# Patient Record
Sex: MALE | Race: WHITE | Employment: UNEMPLOYED | URBAN - METROPOLITAN AREA
[De-identification: names, ages, dates, MRNs, and addresses within clinical notes are randomized per-mention and may not be internally consistent; named-entity substitution may affect disease eponyms.]

---
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
## 2022-01-10 ENCOUNTER — OFFICE VISIT (OUTPATIENT)
Dept: ORTHOPEDICS | Facility: CLINIC | Age: 22
End: 2022-01-10
Payer: COMMERCIAL

## 2022-01-10 ENCOUNTER — ANCILLARY PROCEDURE (OUTPATIENT)
Dept: GENERAL RADIOLOGY | Facility: CLINIC | Age: 22
End: 2022-01-10
Attending: FAMILY MEDICINE
Payer: COMMERCIAL

## 2022-01-10 DIAGNOSIS — M25.511 CHRONIC RIGHT SHOULDER PAIN: ICD-10-CM

## 2022-01-10 DIAGNOSIS — M79.644 FINGER PAIN, RIGHT: ICD-10-CM

## 2022-01-10 DIAGNOSIS — M79.644 FINGER PAIN, RIGHT: Primary | ICD-10-CM

## 2022-01-10 DIAGNOSIS — G89.29 CHRONIC RIGHT SHOULDER PAIN: ICD-10-CM

## 2022-01-10 PROCEDURE — 99203 OFFICE O/P NEW LOW 30 MIN: CPT | Performed by: FAMILY MEDICINE

## 2022-01-10 PROCEDURE — 73140 X-RAY EXAM OF FINGER(S): CPT | Mod: RT | Performed by: RADIOLOGY

## 2022-01-10 NOTE — LETTER
1/10/2022         RE: Gerry Hartman  [address] [facility] [address]        Dear Colleague,    Thank you for referring your patient, Gerry Hartman, to the [facility] [place]. Please see a copy of my visit note below.            [facility]  1/10/2022    Gerry Hartman's chief complaint for this visit includes:  Chief Complaint   Patient presents with     Consult     right small finger pain,      PCP: Saúl Carey    Reason for visit:     What part of your body is injured / painful?  right small finger    What caused the injury /pain? Dislocated finger in June during a rugby game     How long ago did your injury occur or pain begin? several months ago    What are your most bothersome symptoms? Pain    How would you characterize your symptom?  aching    What makes your symptoms better? Rest    What makes your symptoms worse? Movement    Have you been previously seen for this problem? No    Medical History:    Any recent changes to your medical history? No    Any new medication prescribed since last visit? No    Have you had surgery on this body part before? No    Social History:    Handedness: Right    Review of Systems:    Do you have fever, chills, weight loss? No    Do you have any vision problems? No    Do you have any chest pain or edema? No    Do you have any shortness of breath or wheezing?  No    Do you have stomach problems? No    Do you have any urinary track issues? No    Do you have any numbness or focal weakness? No    Do you have diabetes? No    Do you have problems with bleeding or clotting? No    Do you have an rashes or other skin lesions? No       CHIEF COMPLAINT:  Consult (right small finger pain, )       HISTORY OF PRESENT ILLNESS  Mr. Hartman is a pleasant 21 year old male who presents to clinic today with a right small finger injury.  Everett was playing in a rugby match this past summer when he  dislocated his finger.  His finger was reduced by his teacher.  Since this time he has had persistent swelling at the knuckle of his finger, he also has pain at times.  He does not have any dysfunction with movement.    Everett also brings up a right shoulder issue.  He notes that he has been able to move his shoulder and slide in and out of the socket throughout his life, although he does remember an instance in rugby recently in which his arm was pinned back behind him.  This is his dominant arm.  He has pain with certain ranges of motion, pain at the end range of motion of his right shoulder.  He denies any numbness or tingling    Additional history: as documented    MEDICAL HISTORY  Patient Active Problem List   Diagnosis     Contact dermatitis     Acne, unspecified acne type       Current Outpatient Medications   Medication Sig Dispense Refill     doxycycline monohydrate (MONODOX) 100 MG capsule Take 1 capsule (100 mg) by mouth 2 times daily 60 capsule 4     SUMAtriptan (IMITREX) 50 MG tablet Take 1 tablet (50 mg) by mouth at onset of headache for migraine May repeat in 2 hours. Max 4 tablets/24 hours. 10 tablet 1     tretinoin (RETIN-A) 0.05 % external cream Apply topically At Bedtime 45 g 11       No Known Allergies    Family History   Problem Relation Age of Onset     Thyroid Disease Mother      Arthritis Maternal Grandmother      Eye Disorder Maternal Grandmother      Diabetes Maternal Grandfather      Arthritis Maternal Grandfather        Additional medical/Social/Surgical histories reviewed in EPIC and updated as appropriate.        PHYSICAL EXAM  General  - normal appearance, in no obvious distress  HEENT  - conjunctivae not injected, moist mucous membranes  CV  - normal radial pulse  Pulm  - normal respiratory pattern, non-labored  Musculoskeletal - right 5th finger  - inspection: PIP swelling  - palpation: no bony or soft tissue tenderness, no tenderness at the anatomical snuffbox  - ROM:  MCP 90 deg  flexion   0 deg extension    deg flexion   0 deg extension   DIP 80 deg flexion   0 deg extension  - strength: 5/5  strength  - special tests:  (-) varus  (-) valgus    Musculoskeletal - right shoulder  - inspection: normal bone and joint alignment, no obvious deformity, no scapular winging, no AC step-off  - palpation: no bony or soft tissue tenderness, normal clavicle, non-tender AC  - ROM: full range of motion, not painful, can actively sublux shoulder on command  - strength: 5/5  strength, 5/5 in all shoulder planes  - special tests:  (-) Speed's  (-) Neer  (-) Hawkin's  (-) Lukas's  (+) Stewart's    Neuro  - no numbness, no motor deficit, grossly normal coordination, normal muscle tone  Skin  - no ecchymosis, erythema, warmth, or induration, no obvious rash  Psych  - interactive, appropriate, normal mood and affect                 ASSESSMENT & PLAN  Mr. Hartman is a 21 year old male who presents to clinic today with pain in his right small finger after a dislocation this past summer.    I ordered and independently reviewed an x-ray of his finger, this shows no obvious fracture or healed fracture.    We did discuss that he may continue to experience swelling in his finger moving forward, although the fact that he did not have an obvious fracture is good for his joint preservation, long-term.  We discussed watchful waiting versus ordering an MR to evaluate his ligaments, through shared decision making we did decide to watchfully wait for any pain that may present in the future.    With regards to his shoulder he does have symptoms that are concerning for a labrum tear.  We discussed pathophysiology and some treatment strategies, I am referring him to physical therapy.  Everett attends [employer] in [place], he is leaving in 12 days.  Hopefully can get in for a physical therapy session prior to this    It was a pleasure seeing Everett today.    Toño Griffin DO, CAM  [facility]      This  note was constructed using Dragon dictation software, please excuse any minor errors in spelling, grammar, or syntax.        Again, thank you for allowing me to participate in the care of your patient.        Sincerely,        Toño Griffin, DO

## 2022-01-11 ENCOUNTER — VIRTUAL VISIT (OUTPATIENT)
Dept: DERMATOLOGY | Facility: CLINIC | Age: 22
End: 2022-01-11
Payer: COMMERCIAL

## 2022-01-11 DIAGNOSIS — L70.0 ACNE VULGARIS: Primary | ICD-10-CM

## 2022-01-11 PROCEDURE — 99212 OFFICE O/P EST SF 10 MIN: CPT | Mod: 95 | Performed by: DERMATOLOGY

## 2022-01-11 NOTE — LETTER
"    1/11/2022         RE: Gerry Hartman  [address] [facility] [address]        Dear Colleague,    Thank you for referring your patient, Gerry Hartman, to the [facility]. Please see a copy of my visit note below.        Gerry Hartman is a 21 year old male who is being evaluated via a phone  visit.      The patient has been notified of following:     \"This phone  visit will be conducted via a call between you and your physician/provider. We have found that certain health care needs can be provided without the need for an in-person physical exam.  This service lets us provide the care you need with a video conversation.  If a prescription is necessary we can send it directly to your pharmacy.  If lab work is needed we can place an order for that and you can then stop by our lab to have the test done at a later time.    Phone visits are billed at different rates depending on your insurance coverage.  Please reach out to your insurance provider with any questions.    If during the course of the call the physician/provider feels a phone visit is not appropriate, you will not be charged for this service.\"    Patient has given verbal consent for phone visit? Yes    How would you like to obtain your AVS? Paulhart    Gerry Hartman is a 21 year old year old male patient here today for f/u acne.  On doxy, tretinoin and bpo.  Doing well mostly clear no issues.  Patient has no other skin complaints today.  Remainder of the HPI, Meds, PMH, Allergies, FH, and SH was reviewed in chart.    No past medical history on file.    No past surgical history on file.     Family History   Problem Relation Age of Onset     Thyroid Disease Mother      Arthritis Maternal Grandmother      Eye Disorder Maternal Grandmother      Diabetes Maternal Grandfather      Arthritis Maternal Grandfather        Social History     Socioeconomic History     Marital status: Single     Spouse " name: Not on file     Number of children: Not on file     Years of education: Not on file     Highest education level: Not on file   Occupational History     Not on file   Tobacco Use     Smoking status: Never Smoker     Smokeless tobacco: Never Used   Substance and Sexual Activity     Alcohol use: Not on file     Drug use: Not on file     Sexual activity: Never   Other Topics Concern     Not on file   Social History Narrative     Not on file     Social Determinants of Health     Financial Resource Strain: Not on file   Food Insecurity: Not on file   Transportation Needs: Not on file   Physical Activity: Not on file   Stress: Not on file   Social Connections: Not on file   Intimate Partner Violence: Not on file   Housing Stability: Not on file       Outpatient Encounter Medications as of 1/11/2022   Medication Sig Dispense Refill     doxycycline monohydrate (MONODOX) 100 MG capsule Take 1 capsule (100 mg) by mouth 2 times daily 60 capsule 4     SUMAtriptan (IMITREX) 50 MG tablet Take 1 tablet (50 mg) by mouth at onset of headache for migraine May repeat in 2 hours. Max 4 tablets/24 hours. 10 tablet 1     tretinoin (RETIN-A) 0.05 % external cream Apply topically At Bedtime 45 g 11     No facility-administered encounter medications on file as of 1/11/2022.             Review Of Systems  Skin: As above  Eyes: negative  Ears/Nose/Throat: negative  Respiratory: No shortness of breath, dyspnea on exertion, cough, or hemoptysis  Cardiovascular: negative  Gastrointestinal: negative  Genitourinary: negative  Musculoskeletal: negative  Neurologic: negative  Psychiatric: negative  Hematologic/Lymphatic/Immunologic: negative  Endocrine: negative      O:   Alert & Orientedx3, Mood & Affect wnl,    General appearance normal   Alert, oriented and in no acute distress    Mostly clear per patient     Pulm: Breathing Normal, talking in normal sentences, no shortness of breath during conversation    Neuro/Psych: Orientation:Alert and  Orientedx3 ; Mood/Affect:normal ; no anxiety or depression     A/P:  1.acne  Decrease doxy to once daily  Cont tretinoin nightly and bpo daily  Return to clinic 3months    It was a pleasure speaking to Gerry Hartman today.  Previous clinic  notes and pertinent laboratory tests were reviewed prior to Gerry Hartman's visit.  UV precautions reviewed with patient.  Skin care regimen reviewed with patient: Eliminate harsh soaps, i.e. Dial, zest, irsih spring; Mild soaps such as Cetaphil or Dove sensitive skin, avoid hot or cold showers, aggressive use of emollients including vanicream, cetaphil or cerave discussed with patient.    Return to clinic 3 months    Teledermatology information:  - Location of patient: home  - Location of teledermatologist: [facility]    - Reason teledermatology is appropriate: of National Emergency Regarding Coronavirus disease (COVID 19) Outbreak  - The patient's condition can safely be assessed using telemedicine: yes  - Method of transmission: store and forward teledermatology  - In-person dermatology visit recommendation: no  - Service start time:945am/pm  - Service end time:1000am/pm  - Date of report: 01/11/22        Again, thank you for allowing me to participate in the care of your patient.        Sincerely,        Ruslan Anderson MD

## 2022-01-11 NOTE — PROGRESS NOTES
"    Gerry Hartman is a 21 year old male who is being evaluated via a phone  visit.      The patient has been notified of following:     \"This phone  visit will be conducted via a call between you and your physician/provider. We have found that certain health care needs can be provided without the need for an in-person physical exam.  This service lets us provide the care you need with a video conversation.  If a prescription is necessary we can send it directly to your pharmacy.  If lab work is needed we can place an order for that and you can then stop by our lab to have the test done at a later time.    Phone visits are billed at different rates depending on your insurance coverage.  Please reach out to your insurance provider with any questions.    If during the course of the call the physician/provider feels a phone visit is not appropriate, you will not be charged for this service.\"    Patient has given verbal consent for phone visit? Yes    How would you like to obtain your AVS? Ayaan    Gerry Hartman is a 21 year old year old male patient here today for f/u acne.  On doxy, tretinoin and bpo.  Doing well mostly clear no issues.  Patient has no other skin complaints today.  Remainder of the HPI, Meds, PMH, Allergies, FH, and SH was reviewed in chart.    No past medical history on file.    No past surgical history on file.     Family History   Problem Relation Age of Onset     Thyroid Disease Mother      Arthritis Maternal Grandmother      Eye Disorder Maternal Grandmother      Diabetes Maternal Grandfather      Arthritis Maternal Grandfather        Social History     Socioeconomic History     Marital status: Single     Spouse name: Not on file     Number of children: Not on file     Years of education: Not on file     Highest education level: Not on file   Occupational History     Not on file   Tobacco Use     Smoking status: Never Smoker     Smokeless tobacco: Never Used   Substance and " Sexual Activity     Alcohol use: Not on file     Drug use: Not on file     Sexual activity: Never   Other Topics Concern     Not on file   Social History Narrative     Not on file     Social Determinants of Health     Financial Resource Strain: Not on file   Food Insecurity: Not on file   Transportation Needs: Not on file   Physical Activity: Not on file   Stress: Not on file   Social Connections: Not on file   Intimate Partner Violence: Not on file   Housing Stability: Not on file       Outpatient Encounter Medications as of 1/11/2022   Medication Sig Dispense Refill     doxycycline monohydrate (MONODOX) 100 MG capsule Take 1 capsule (100 mg) by mouth 2 times daily 60 capsule 4     SUMAtriptan (IMITREX) 50 MG tablet Take 1 tablet (50 mg) by mouth at onset of headache for migraine May repeat in 2 hours. Max 4 tablets/24 hours. 10 tablet 1     tretinoin (RETIN-A) 0.05 % external cream Apply topically At Bedtime 45 g 11     No facility-administered encounter medications on file as of 1/11/2022.             Review Of Systems  Skin: As above  Eyes: negative  Ears/Nose/Throat: negative  Respiratory: No shortness of breath, dyspnea on exertion, cough, or hemoptysis  Cardiovascular: negative  Gastrointestinal: negative  Genitourinary: negative  Musculoskeletal: negative  Neurologic: negative  Psychiatric: negative  Hematologic/Lymphatic/Immunologic: negative  Endocrine: negative      O:   Alert & Orientedx3, Mood & Affect wnl,    General appearance normal   Alert, oriented and in no acute distress    Mostly clear per patient     Pulm: Breathing Normal, talking in normal sentences, no shortness of breath during conversation    Neuro/Psych: Orientation:Alert and Orientedx3 ; Mood/Affect:normal ; no anxiety or depression     A/P:  1.acne  Decrease doxy to once daily  Cont tretinoin nightly and bpo daily  Return to clinic 3months    It was a pleasure speaking to Gerry Hartman today.  Previous clinic  notes and  pertinent laboratory tests were reviewed prior to Gerry Hartman's visit.  UV precautions reviewed with patient.  Skin care regimen reviewed with patient: Eliminate harsh soaps, i.e. Dial, zest, irsih spring; Mild soaps such as Cetaphil or Dove sensitive skin, avoid hot or cold showers, aggressive use of emollients including vanicream, cetaphil or cerave discussed with patient.    Return to clinic 3 months    Teledermatology information:  - Location of patient: home  - Location of teledermatologist: [facility]    - Reason teledermatology is appropriate: of National Emergency Regarding Coronavirus disease (COVID 19) Outbreak  - The patient's condition can safely be assessed using telemedicine: yes  - Method of transmission: store and forward teledermatology  - In-person dermatology visit recommendation: no  - Service start time:945am/pm  - Service end time:1000am/pm  - Date of report: 01/11/22

## 2022-08-21 ENCOUNTER — HEALTH MAINTENANCE LETTER (OUTPATIENT)
Age: 22
End: 2022-08-21

## 2022-11-20 ENCOUNTER — HEALTH MAINTENANCE LETTER (OUTPATIENT)
Age: 22
End: 2022-11-20

## 2023-09-16 ENCOUNTER — HEALTH MAINTENANCE LETTER (OUTPATIENT)
Age: 23
End: 2023-09-16

## 2023-09-21 ENCOUNTER — OFFICE VISIT (OUTPATIENT)
Dept: FAMILY MEDICINE | Facility: CLINIC | Age: 23
End: 2023-09-21
Payer: COMMERCIAL

## 2023-09-21 VITALS
HEART RATE: 68 BPM | WEIGHT: 179.4 LBS | TEMPERATURE: 98.6 F | SYSTOLIC BLOOD PRESSURE: 131 MMHG | OXYGEN SATURATION: 99 % | BODY MASS INDEX: 25.11 KG/M2 | HEIGHT: 71 IN | RESPIRATION RATE: 17 BRPM | DIASTOLIC BLOOD PRESSURE: 74 MMHG

## 2023-09-21 DIAGNOSIS — R22.9 SUBCUTANEOUS MASS: ICD-10-CM

## 2023-09-21 DIAGNOSIS — B02.9 HERPES ZOSTER WITHOUT COMPLICATION: Primary | ICD-10-CM

## 2023-09-21 PROCEDURE — 99214 OFFICE O/P EST MOD 30 MIN: CPT | Performed by: FAMILY MEDICINE

## 2023-09-21 RX ORDER — FAMCICLOVIR 500 MG/1
500 TABLET ORAL 3 TIMES DAILY
Qty: 21 TABLET | Refills: 0 | Status: SHIPPED | OUTPATIENT
Start: 2023-09-21 | End: 2023-09-28

## 2023-09-21 ASSESSMENT — PAIN SCALES - GENERAL: PAINLEVEL: SEVERE PAIN (6)

## 2023-09-21 NOTE — PROGRESS NOTES
"  Assessment & Plan     Herpes zoster without complication  start  - famciclovir (FAMVIR) 500 MG tablet  Dispense: 21 tablet; Refill: 0  Ibuprofen as needed pain  Callasap if any vision changes  Follow up only if unimproved.   Subcutaneous mass  Has gotten bigger and smaller  - Adult General Surg Referral               See Patient Instructions    Saúl Carey MD  [facility] LIZZIE Floyd is a 23 year old, presenting for the following health issues:  Derm Problem (rash)      9/21/2023     2:57 PM   Additional Questions   Roomed by loula   Accompanied by self       History of Present Illness       Headaches:   Since the patient's last clinic visit, headaches are: no change  The patient is getting headaches:  Occasionally  He is able to do normal daily activities when he has a migraine.  The patient is taking the following rescue/relief medications:  Ibuprofen (Advil, Motrin)   Patient states \"I get total relief\" from the rescue/relief medications.   The patient is taking the following medications to prevent migraines:  No medications to prevent migraines  In the past 4 weeks, the patient has gone to an Urgent Care or Emergency Room 0 times times due to headaches.    Reason for visit:  Chest rash neck aches fatigue  Symptom onset:  3-7 days ago  Symptoms include:  Rash aches fatigue  Symptom intensity:  Moderate  Symptom progression:  Worsening  Had these symptoms before:  No    He eats 0-1 servings of fruits and vegetables daily.He consumes 0 sweetened beverage(s) daily.He exercises with enough effort to increase his heart rate 20 to 29 minutes per day.  He exercises with enough effort to increase his heart rate 7 days per week.   He is taking medications regularly.         Rash  Onset/Duration: a couple days  Description  Location: left neck/ lower face  Character: painful, burning  Itching: no  Intensity:  moderate  Progression of Symptoms:  worsening  Accompanying signs and " "symptoms:   Fever: No  Body aches or joint pain: YES  Sore throat symptoms: No  Recent cold symptoms: No  History:           Previous episodes of similar rash: None  New exposures:  None  Recent travel: No  Exposure to similar rash: No  Precipitating or alleviating factors:   Therapies tried and outcome:   Had varicella as child  Skin Lesion  Onset/Duration: months  Description  Location: left upper back  Color: brown  Border description: nodule  Character: round  Itching: no  Bleeding:  No  Intensity:  mild  Progression of Symptoms:  waxing and waning  Accompanying signs and symptoms:   Bleeding: No  Scaling: No    Therapies tried and outcome:         Review of Systems   Constitutional, HEENT, cardiovascular, pulmonary, gi and gu systems are negative, except as otherwise noted.      Objective    /74   Pulse 68   Temp 98.6  F (37  C) (Temporal)   Resp 17   Ht 1.815 m (5' 11.46\")   Wt 81.4 kg (179 lb 6.4 oz)   SpO2 99%   BMI 24.70 kg/m    Body mass index is 24.7 kg/m .  Physical Exam   GENERAL: mild distress  EYES: Eyes grossly normal to inspection, PERRL and conjunctivae and sclerae normal  HENT: ear canals and TM's normal, nose and mouth without ulcers or lesions  SKIN: zosteriform eruption - neck    2 by 2 cm rubbery nodule left upper back, subcutaneous , not tender  NEURO: Normal strength and tone, mentation intact and speech normal  PSYCH: mentation appears normal, affect normal/bright  LYMPH: no cervical, supraclavicular, axillary, or inguinal adenopathy                      "

## 2023-09-22 ENCOUNTER — TELEPHONE (OUTPATIENT)
Dept: FAMILY MEDICINE | Facility: CLINIC | Age: 23
End: 2023-09-22
Payer: COMMERCIAL

## 2023-09-22 NOTE — TELEPHONE ENCOUNTER
Called pt and relayed message. He states he just was able to get the medication so I advised him to take 2 pills this evening and then start the TID tomorrow.    Augustine Moreno RN   [facility]

## 2023-09-22 NOTE — TELEPHONE ENCOUNTER
Pt calling about famciclovir, he states he swears he remembers you saying something about taking 1 pill the first day then increasing over the next couple days but the rx states TID for 7 days and pt is wanting you to advise. I did advise as rx states in our system but they still wanted a message to you.    Thanks!  Augustine Moreno RN   [facility]

## 2024-04-19 ENCOUNTER — OFFICE VISIT (OUTPATIENT)
Dept: FAMILY MEDICINE | Facility: CLINIC | Age: 24
End: 2024-04-19
Payer: COMMERCIAL

## 2024-04-19 ENCOUNTER — ANCILLARY PROCEDURE (OUTPATIENT)
Dept: GENERAL RADIOLOGY | Facility: CLINIC | Age: 24
End: 2024-04-19
Payer: COMMERCIAL

## 2024-04-19 VITALS
HEART RATE: 71 BPM | SYSTOLIC BLOOD PRESSURE: 110 MMHG | RESPIRATION RATE: 16 BRPM | OXYGEN SATURATION: 97 % | WEIGHT: 178.3 LBS | TEMPERATURE: 98.2 F | HEIGHT: 71 IN | DIASTOLIC BLOOD PRESSURE: 60 MMHG | BODY MASS INDEX: 24.96 KG/M2

## 2024-04-19 DIAGNOSIS — M25.511 ACUTE PAIN OF RIGHT SHOULDER: ICD-10-CM

## 2024-04-19 DIAGNOSIS — M25.511 ACUTE PAIN OF RIGHT SHOULDER: Primary | ICD-10-CM

## 2024-04-19 PROCEDURE — 73030 X-RAY EXAM OF SHOULDER: CPT | Mod: TC | Performed by: RADIOLOGY

## 2024-04-19 PROCEDURE — 99213 OFFICE O/P EST LOW 20 MIN: CPT

## 2024-04-19 RX ORDER — CELECOXIB 100 MG/1
100 CAPSULE ORAL 2 TIMES DAILY
Qty: 28 CAPSULE | Refills: 0 | Status: SHIPPED | OUTPATIENT
Start: 2024-04-19 | End: 2024-05-03

## 2024-04-19 RX ORDER — CYCLOBENZAPRINE HCL 5 MG
5 TABLET ORAL 2 TIMES DAILY PRN
Qty: 28 TABLET | Refills: 0 | Status: SHIPPED | OUTPATIENT
Start: 2024-04-19 | End: 2024-05-03

## 2024-04-19 ASSESSMENT — PAIN SCALES - GENERAL: PAINLEVEL: MODERATE PAIN (4)

## 2024-04-19 NOTE — PATIENT INSTRUCTIONS
Shoudler Injury    Pain Management  Begin taking medications to manage shoulder pain today as we discussed. These will work best to manage your pain when taken around the clock rather than every so often when your pain is worse  -celebrex 100mg twice daily for 14 days  -Flexeril 5mg     Once you have completed the above medications, you may use the below options for pain management  -Tylenol 325-650mg every 4-6 hours  -Ibuprofen 600-800mg every 6-8 hours    Apply cold packs to the area for 20min at a time 4 times per day. This will help to reduce swelling and offer some numbing to manage pain. You can also use warm packs to manage pain if this provides better relief.    Movement  It is very important to maintain your normal daily activities as tolerated. Avoiding sitting, laying, standing, or remaining in one position for extended periods of time. Continuing to stretch and move your body will help to encourage healing and keep your muscles loose.    If you find that PT would be a helpful options, please reach out and let me know so that I can place that referral    Next Step  If this plan doesn't seem to be helping after 4-6 weeks then I want you to come back to see me, and we can discuss what the next options might be. Seek emergency medical help If pain becomes unbearable, is not responding to pain medication, or is accompanied by fever, warmth, or loss of function.

## 2024-04-19 NOTE — PROGRESS NOTES
Assessment & Plan     (M25.511) Acute pain of right shoulder  (primary encounter diagnosis)  Comment: Acute. No signs of respiratory distress, vital signs stable, and no red flag signs requiring immediate need for medical attention.  No concern for joint effusion, dislocation, or severe fracture.  More likely to believe that patient is experiencing tendinitis or mild rotator cuff injury.  Will do x-ray today for baseline imaging, treat more conservatively with 2 weeks of Celebrex and Flexeril, and home exercises.  Patient declines any need for PT referral at this time, and noted that patient could reach out if referral necessary in the future.  Discussed dosing and possible side effects of medication as well as follow-up to clinic instructions in 4 weeks if symptoms persist or do not improve.  Discussed alarm signs that would require the need for immediate medical attention.  Plan: XR Shoulder Right G/E 3 Views, celecoxib         (CELEBREX) 100 MG capsule, cyclobenzaprine         (FLEXERIL) 5 MG tablet      Ordering of each unique test  Prescription drug management  I spent a total of 20 minutes on the day of the visit.   Time spent by me doing chart review, history and exam, documentation and further activities per the note    FUTURE APPOINTMENTS:       - Follow-up visit in 4 to 6 weeks with PCP if symptoms worsen or do not improve       - Follow-up for annual visit or as needed  Patient Instructions   Shoudler Injury    Pain Management  Begin taking medications to manage shoulder pain today as we discussed. These will work best to manage your pain when taken around the clock rather than every so often when your pain is worse  -celebrex 100mg twice daily for 14 days  -Flexeril 5mg     Once you have completed the above medications, you may use the below options for pain management  -Tylenol 325-650mg every 4-6 hours  -Ibuprofen 600-800mg every 6-8 hours    Apply cold packs to the area for 20min at a time 4 times  per day. This will help to reduce swelling and offer some numbing to manage pain. You can also use warm packs to manage pain if this provides better relief.    Movement  It is very important to maintain your normal daily activities as tolerated. Avoiding sitting, laying, standing, or remaining in one position for extended periods of time. Continuing to stretch and move your body will help to encourage healing and keep your muscles loose.    If you find that PT would be a helpful options, please reach out and let me know so that I can place that referral    Next Step  If this plan doesn't seem to be helping after 4-6 weeks then I want you to come back to see me, and we can discuss what the next options might be. Seek emergency medical help If pain becomes unbearable, is not responding to pain medication, or is accompanied by fever, warmth, or loss of function.      Subha Floyd is a 23 year old, presenting for the following health issues:  office visit and Recheck Medication (Pt reports that he is here to discuss rt shoulder pain that has been present for 3 weeks)      4/19/2024     1:58 PM   Additional Questions   Roomed by padilla   Accompanied by mirtha         4/19/2024     1:58 PM   Patient Reported Additional Medications   Patient reports taking the following new medications none     History of Present Illness       Reason for visit:  Shoulder injury  Symptom onset:  1-2 weeks ago  Symptoms include:  Limitted mobility in right shoulder  Symptom intensity:  Moderate  Symptom progression:  Staying the same  Had these symptoms before:  No  What makes it worse:  Pulling exercises    He eats 0-1 servings of fruits and vegetables daily.He consumes 1 sweetened beverage(s) daily.He exercises with enough effort to increase his heart rate 30 to 60 minutes per day.  He exercises with enough effort to increase his heart rate 5 days per week.   He is taking medications regularly.     Gerry is a 23-year-old male with a  "past medical history significant for contact dermatitis and acne who presents today with concerns for right shoulder pain.  Patient reports that he was skiing on March 31, and went off a jump at which time he sustained an injury to his shoulder after landing on his extended arm.  He notes that after this, symptoms seem to improve over time, but he reports that about 1 week ago he noticed that he had ongoing pain when doing pulling motions from overhead and when anteriorly rotating the shoulder to tuck his hand behind his back.  He notes that pain is not constant, and is only present when doing overhead pulling motions and reaching behind his back.  He notes that pain at those times is sharp and rates it as a 7 out of 10.  He has not found anything that significantly relieves the pain, and has not tried any pain medications.  He declines any weakness, numbness, tingling, burning pain, or pain that radiates down his arm or up his neck.  He declines any joint laxity, or feeling that his arm is giving out.  He has full range of motion and strength in the extremity.  He declines any involvement of the left upper extremity, neck, head, or back.  Patient feels altogether well otherwise and declines any redness, swelling, or heat at the site, and declines any systemic illness symptoms such as fever, chills, and full body aches.      Review of Systems  Constitutional, HEENT, cardiovascular, pulmonary, gi and gu systems are negative, except as otherwise noted.      Objective    /60 (BP Location: Left arm, Patient Position: Sitting, Cuff Size: Adult Regular)   Pulse 71   Temp 98.2  F (36.8  C) (Tympanic)   Resp 16   Ht 1.803 m (5' 11\")   Wt 80.9 kg (178 lb 4.8 oz)   SpO2 97%   BMI 24.87 kg/m    Body mass index is 24.87 kg/m .  Physical Exam   GENERAL: alert and no distress  RESP: lungs clear to auscultation - no rales, rhonchi or wheezes  CV: regular rate and rhythm, normal S1 S2, no S3 or S4, no murmur, click or " rub, no peripheral edema  MS: no gross musculoskeletal defects noted, no edema  ORTHO:   SHOULDER Exam-Right   Inspection: no swelling, no bruising, no discoloration, no obvious deformity, no asymmetry, no glenohumeral joint anterior bulge, no distal clavicle elevation, no muscle atrophy, no scapular winging   Tenderness of: SC joint- no , clavicle(prox-mid)- no , clavicle-(mid-distal)- no , AC joint- no , acromion- no , anterior capsule- no , prox bicep tendon- no , greater tuberosity- no , prox humerus- no , supraspinatous- no , infraspinatous- YES , superior trapezious- no , rhomboids- no    Range of Motion: Active- limited due to pain.  Range of Motion: Passive- forward flexion- normal but painful, abduction- normal but painful, external rotation- normal but painful, internal rotation- normal   Strength: forward flexion- 5/5, abduction- 5/5, internal rotation- 5/5, external rotation- 5/5 and bicep- full   Special tests: Neers- POSITIVE and [place](supraspinatous)- POSITIVE   Opposite shoulder WNL     SKIN: no suspicious lesions or rashes  NEURO: Normal strength and tone, mentation intact and speech normal    Toyin Hilario DNP FNP-C  Family Nurse Practitioner - Same Day Provider  [facility] [place]          Signed Electronically by: ALOK Milton CNP

## 2024-11-06 ENCOUNTER — OFFICE VISIT (OUTPATIENT)
Dept: FAMILY MEDICINE | Facility: CLINIC | Age: 24
End: 2024-11-06
Payer: COMMERCIAL

## 2024-11-06 VITALS
RESPIRATION RATE: 16 BRPM | HEART RATE: 57 BPM | HEIGHT: 72 IN | WEIGHT: 180.4 LBS | TEMPERATURE: 99.1 F | OXYGEN SATURATION: 99 % | DIASTOLIC BLOOD PRESSURE: 73 MMHG | SYSTOLIC BLOOD PRESSURE: 127 MMHG | BODY MASS INDEX: 24.43 KG/M2

## 2024-11-06 DIAGNOSIS — Z71.84 TRAVEL ADVICE ENCOUNTER: Primary | ICD-10-CM

## 2024-11-06 PROCEDURE — 90691 TYPHOID VACCINE IM: CPT | Mod: GA | Performed by: NURSE PRACTITIONER

## 2024-11-06 PROCEDURE — 90471 IMMUNIZATION ADMIN: CPT | Mod: GA | Performed by: NURSE PRACTITIONER

## 2024-11-06 PROCEDURE — 99402 PREV MED CNSL INDIV APPRX 30: CPT | Mod: 25 | Performed by: NURSE PRACTITIONER

## 2024-11-06 RX ORDER — AZITHROMYCIN 500 MG/1
TABLET, FILM COATED ORAL
Qty: 3 TABLET | Refills: 0 | Status: SHIPPED | OUTPATIENT
Start: 2024-11-06

## 2024-11-06 ASSESSMENT — PAIN SCALES - GENERAL: PAINLEVEL_OUTOF10: NO PAIN (0)

## 2024-11-06 NOTE — PATIENT INSTRUCTIONS
Thank you for visiting the [facility] : [phone]  Today November 6, 2024 you received the    Typhoid - injectable. This vaccine is valid for two years.     Follow up vaccine appointments can be made as a NURSE ONLY visit at the Travel Clinic, (BE PREPARED TO WAIT, ) or at designated [facility].    If you are receiving the Rabies vaccines series, it is important that you follow the exact schedule ordered.     Pre-travel     We recommend that you purchase Medical Evacuation Insurance prior to your departure.  Https://wwwnc.cdc.gov/travel/page/insurance    [place] your travel plans with the  Department of State through STEP ( Smart Traveler Enrollment Program ) https://step.state.gov.  STEP is a free service to allow [place] citizens and nationals traveling and living abroad to enroll their trip with the nearest U[place]. Embassy or Consulate.    Animal Exposure: Avoid all mammals even if they look healthy.  If there is a bite, scratch or even a lick, wash area immediately with soap and water for 15 minutes and seek medical care within 24 hours for evaluation of Rabies post exposure treatment.  Contact your Medical Evacuation Insurance.    Repiratory illness prevention strategies ( Covid and Influenza ) CDC recommendations:  Consider wearing a mask in crowded or poorly ventilated indoor areas, including on public transportation and in transportation hubs.  Hand washing: frequent, thorough handwashing with soap and water for 20 seconds. Use an alcohol-based hand  with at least 60% alcohol if soap and water are not readily available. Avoid touching face, nose, eyes, mouth unless you have done appropriate hand washing as above.  VACCINES: Staying up to date on COVID-19 vaccines significantly lowers the risk of getting very sick, being hospitalized, or dying from COVID-19. CDC recommends that everyone stay up to date on their COVID-19 vaccines, especially people  with weakened immune systems.    Travel Covid 19 Testing:  updated 12/06/2021  International travelers: Pre-travel:  See country specific Embassy websites or airline websites.    Post travel: CDC recommends getting tested 3-5 days after your trip     Post-travel illness:  Contact your provider or [facility] if you develop a fever, rash, cough, diarrhea or other symptoms for up to 1 year after travel.  Inform your healthcare provider when and where you traveled to.    Please call the [facility] with any questions [phone]  Or send your provider a 'My Chart' note.

## 2024-11-06 NOTE — NURSING NOTE
Pt received typhoid vaccine per ALOK Sanchez CNP's orders. Pt given VIS form prior to immunization administration.   Prior to immunization administration, verified patients identity using patient s name and date of birth. Please see Immunization Activity for additional information.     Screening Questionnaire for Adult Immunization    Are you sick today?   No   Do you have allergies to medications, food, a vaccine component or latex?   No   Have you ever had a serious reaction after receiving a vaccination?   No   Do you have a long-term health problem with heart, lung, kidney, or metabolic disease (e.g., diabetes), asthma, a blood disorder, no spleen, complement component deficiency, a cochlear implant, or a spinal fluid leak?  Are you on long-term aspirin therapy?   No   Do you have cancer, leukemia, HIV/AIDS, or any other immune system problem?   No   Do you have a parent, brother, or sister with an immune system problem?   No   In the past 3 months, have you taken medications that affect  your immune system, such as prednisone, other steroids, or anticancer drugs; drugs for the treatment of rheumatoid arthritis, Crohn s disease, or psoriasis; or have you had radiation treatments?   No   Have you had a seizure, or a brain or other nervous system problem?   No   During the past year, have you received a transfusion of blood or blood    products, or been given immune (gamma) globulin or antiviral drug?   No   For women: Are you pregnant or is there a chance you could become       pregnant during the next month?   No   Have you received any vaccinations in the past 4 weeks?   No     Immunization questionnaire answers were all negative.    Patient instructed to remain in clinic for 15 minutes afterwards, and to report any adverse reactions.     Performed by Thiago Griffin RN on 11/6/2024.

## 2024-11-06 NOTE — PROGRESS NOTES
"Nurse Note ( Pre-Travel Consult)    Itinerary:  [place], [place]     Departure Date: 01/?/2025    Return Date: 07/?/2025    Length of Trip 7 months    Reason for Travel: Volunteer work in [place]          Urban or rural: urban    Accommodations: Private dwelling        IMMUNIZATION HISTORY  Have you received any immunizations within the past 4 weeks?  No  Have you ever fainted from having your blood drawn or from an injection?  No  Have you ever had a fever reaction to vaccination?  No  Have you ever had any bad reaction or side effect from any vaccination?  No  Have you ever had hepatitis A or B vaccine?  Yes  Do you live (or work closely) with anyone who has AIDS, an AIDS-like condition, any other immune disorder or who is on chemotherapy for cancer?  No  Do you have a family history of immunodeficiency?  No  Have you received any injection of immune globulin or any blood products during the past 12 months?  No    Patient roomed by Isaac Mascorro  Gerry Hartman is a 24 year old male seen today alone for counsultation for international travel.   Patient will be departing in  approx 2 month(s) and  traveling alone.      Patient itinerary :  will be in the urban region [facility] for most of the time ( 3 months )    Then to [place] ( urban ) and working and visiting friends and possibly staying  longer       Patient's activities will include volunteer work and visiting friends.    Patient's country of birth is     Special medical concerns: none  Pre-travel questionnaire was completed by patient and reviewed by provider.     Vitals: /73   Pulse 57   Temp 99.1  F (37.3  C) (Temporal)   Resp 16   Ht 1.822 m (5' 11.75\")   Wt 81.8 kg (180 lb 6.4 oz)   SpO2 99%   BMI 24.64 kg/m    BMI= Body mass index is 24.64 kg/m .    EXAM:  General:  Well-nourished, well-developed in no acute distress.  Appears to be stated age, interacts appropriately and expresses understanding of information " given to patient.    Current Outpatient Medications   Medication Sig Dispense Refill    celecoxib (CELEBREX) 100 MG capsule Take 1 capsule (100 mg) by mouth 2 times daily for 14 days 28 capsule 0    famciclovir (FAMVIR) 500 MG tablet Take 1 tablet (500 mg) by mouth 3 times daily for 7 days 21 tablet 0    tretinoin (RETIN-A) 0.05 % external cream Apply topically At Bedtime (Patient not taking: Reported on 11/6/2024) 45 g 11     Patient Active Problem List   Diagnosis    Contact dermatitis    Acne, unspecified acne type     No Known Allergies      Immunizations discussed include:   Covid 19: Declined  Not concerned about risk of disease  Hepatitis A:  Up to date  Hepatitis B: Up to date  Influenza: Declined  Not concerned about risk of disease  Typhoid: Ordered/given today, risks, benefits and side effects reviewed  Rabies: Declined  reviewed managment of a animal bite or scratch (washing wound, seek medical care within 24 hours for post exposure prophylaxis )  Yellow Fever: Not indicated  Japanese Encephalitis: Not indicated  Meningococcus: Up to date  Tetanus/Diphtheria: Up to date  Measles/Mumps/Rubella: Up to date  Cholera: Not needed  Polio: Up to date  Pneumococcal: Under age of 65  Varicella: Up to date and Immune by disease history per patient report  Shingrix: Not indicated  HPV:  Declined  Not concerned about risk of disease . Recommended  shared decision making     TB: low risk     Altitude Exposure on this trip: no  Past tolerance to Altitude: na    ASSESSMENT/PLAN:  Everett was seen today for travel clinic.    Diagnoses and all orders for this visit:    Travel advice encounter  -     azithromycin (ZITHROMAX) 500 MG tablet; Take 1 tablet a day for up to 3 days for severe diarrhea    Other orders  -     TYPHOID VACCINE, IM      I have reviewed general recommendations for safe travel   including: food/water precautions, insect precautions, safer sex   practices given high prevalence of Zika, HIV and other STDs,    roadway safety. Educational materials and [employer] report provided.    Malaraia prophylaxis recommended: none  Symptomatic treatment for traveler's diarrhea: azithromycin        Evacuation insurance advised and resources were provided to patient.    Total visit time 23 minutes  with over 50% of time spent counseling patient and shared decision making as detailed above.    Phoebe Cole CNP  Certificate in Travel Health

## 2024-11-09 ENCOUNTER — HEALTH MAINTENANCE LETTER (OUTPATIENT)
Age: 24
End: 2024-11-09

## 2024-12-12 ENCOUNTER — OFFICE VISIT (OUTPATIENT)
Dept: FAMILY MEDICINE | Facility: CLINIC | Age: 24
End: 2024-12-12
Payer: COMMERCIAL

## 2024-12-12 VITALS
TEMPERATURE: 97.6 F | BODY MASS INDEX: 25.9 KG/M2 | WEIGHT: 185 LBS | HEART RATE: 77 BPM | HEIGHT: 71 IN | DIASTOLIC BLOOD PRESSURE: 64 MMHG | OXYGEN SATURATION: 98 % | SYSTOLIC BLOOD PRESSURE: 120 MMHG | RESPIRATION RATE: 16 BRPM

## 2024-12-12 DIAGNOSIS — Z23 NEED FOR IMMUNIZATION AGAINST YELLOW FEVER: ICD-10-CM

## 2024-12-12 DIAGNOSIS — Z71.84 ENCOUNTER FOR COUNSELING FOR TRAVEL: Primary | ICD-10-CM

## 2024-12-12 RX ORDER — ATOVAQUONE AND PROGUANIL HYDROCHLORIDE 250; 100 MG/1; MG/1
1 TABLET, FILM COATED ORAL DAILY
Qty: 100 TABLET | Refills: 0 | Status: SHIPPED | OUTPATIENT
Start: 2024-12-12

## 2024-12-12 NOTE — PROGRESS NOTES
SUBJECTIVE: Gerry Hartman , a 24 year old  male, presents for counseling and information regarding upcoming travel to [facility]. Special medical concerns include: none. He anticipates the following unusual exposures: none.    Itinerary:  volunteer work- [facility]    Departure Date: 1/11/2025 Return date: 4/1/2025    Reason for travel (i.e. Business, pleasure): pleasure    Visiting an urban or rural area?: urban    Accommodations (i.e. hotel, hostel, friends, family, etc): friends    Women - First day of your last period: NA    IMMUNIZATION HISTORY  Have you received any vaccinations in the past 4 weeks? If so, which? Yes  Have you ever fainted from having your blood drawn or from an injection?  No  Have you ever had any bad reaction or side effect from any vaccination?  If so, which? No  Do you live (or work closely) with anyone who has AIDS, an AIDS-like condition, any other immune disorder or who is on chemotherapy for cancer?  No  Have you received any injection of immune globulin or any blood products during the past 12 months?  No    GENERAL MEDICAL HISTORY  Do you have a medical condition that requires medicine or doctor follow-up visits?  No  Do you have a medical condition that is stable now, but that may recur while traveling?  No  Has your spleen been removed?  No  Have you had an illness or a fever in the past 48 hours?  No  Are you pregnant, or might you become pregnant on this trip?  Any chance of pregnancy?  No  Are you breastfeeding?  No  Do you have HIV, AIDS, an AIDS-like condition, any other immune disorder, leukemia or cancer?  No  Have you had your thymus gland removed or history of problems with your thymus, such as myasthenia gravis, DiGeorge syndrome, or thymoma?  No  Do you have a severely low platelet count (thrombocytopenia) or a blood clotting disorder?  No  Have you ever had a convulsion, seizure, epilepsy, neurologic condition or brain infection?  No  Do you have any stomach  conditions?  No  Do you have severe renal or kidney impairment?  No  Do you have a history of mental health concerns?  No  Do you get yeast infections often?  No  Do you have psoriasis?  No  Do you get motion sickness?  No  Have you ever had headaches, nausea, vomiting, or breathing problems from altitude exposure?  No    MEDICINES  Are you taking:   Steroids, prednisone, anti-cancer drugs, or medicines that suppress your immune system? No  Antibiotics or sulfonamides? No  Oral contraceptives (birth control pills)? No  Aspirin therapy (children and teens)? No    ALLERGIES  Are you allergic to:  Any medicines? No  Any foods or other? No  Neomycin, formalin, or fish products? No      No past medical history on file.   Immunization History   Administered Date(s) Administered    DTAP (<7y) 2002, 01/03/2003, 03/04/2003, 12/02/2003, 09/04/2007    HEPA 03/25/2014, 05/27/2016    HEPATITIS A (PEDS 12M-18Y) 03/25/2014    HIB (PRP-T) 2002, 01/03/2003, 03/04/2003, 12/02/2003    Hepatitis A (ADULT 19+) 05/27/2016    Hepatitis B, Peds 07/21/2020, 10/26/2020, 12/22/2020    Historic Hib Hib-titer 2002, 01/03/2003, 03/04/2003    MMR 09/05/2003, 09/04/2007    Meningococcal ACWY (Menactra ) 03/25/2014, 07/21/2020    Meningococcal ACWY (Menveo ) 03/25/2014    Poliovirus, inactivated (IPV) 2002, 01/03/2003, 03/04/2003, 09/04/2007    TDAP Vaccine (Adacel) 05/27/2016    TRIHIBIT (DTAP/HIB, <7y) 12/02/2003    Typhoid IM 11/06/2024    Varicella 09/04/2007       Current Outpatient Medications   Medication Sig Dispense Refill    azithromycin (ZITHROMAX) 500 MG tablet Take 1 tablet a day for up to 3 days for severe diarrhea 3 tablet 0     No Known Allergies     EXAM: deferred    Immunizations discussed include: Yellow Fever, up to date on everything else  Malaria prophylaxis recommended: Malarone  Symptomatic treatment for traveler's diarrhea: bismuth subsalicylate, loperamide/diphenoxylate, and azithromycin already  given    ASSESSMENT/PLAN:    (Z71.84) Encounter for counseling for travel  (primary encounter diagnosis)    Comment: Yellow fever vaccines today. Patient will return or follow-up with PCP as needed. Prophylaxis given for Traveler's diarrhea and Malaria. All questions were answered.     Plan: atovaquone-proguanil (MALARONE) 250-100 MG         tablet            (Z23) Need for immunization against yellow fever  Comment:   Plan: YELLOW FEVER, LIVE SQ              I have reviewed general recommendations for safe travel   including: food/water precautions, insect avoidance, safe sex   practices given high prevalence of HIV and other STDs,   roadway safety. Educational materials and links to the CDC   Traveler's health website have been provided.    Total time 14 minutes, greater than 50 percent in counseling   and coordination of care.

## 2024-12-12 NOTE — NURSING NOTE
Prior to immunization administration, verified patients identity using patient s name and date of birth. Please see Immunization Activity for additional information.     Screening Questionnaire for Adult Immunization    Are you sick today?   No   Do you have allergies to medications, food, a vaccine component or latex?   No   Have you ever had a serious reaction after receiving a vaccination?   No   Do you have a long-term health problem with heart, lung, kidney, or metabolic disease (e.g., diabetes), asthma, a blood disorder, no spleen, complement component deficiency, a cochlear implant, or a spinal fluid leak?  Are you on long-term aspirin therapy?   No   Do you have cancer, leukemia, HIV/AIDS, or any other immune system problem?   No   Do you have a parent, brother, or sister with an immune system problem?   No   In the past 3 months, have you taken medications that affect  your immune system, such as prednisone, other steroids, or anticancer drugs; drugs for the treatment of rheumatoid arthritis, Crohn s disease, or psoriasis; or have you had radiation treatments?   No   Have you had a seizure, or a brain or other nervous system problem?   No   During the past year, have you received a transfusion of blood or blood    products, or been given immune (gamma) globulin or antiviral drug?   No   For women: Are you pregnant or is there a chance you could become       pregnant during the next month?   No   Have you received any vaccinations in the past 4 weeks?   Yes     Immunization questionnaire was positive for at least one answer.  Notified Ace Kruse.      Patient instructed to remain in clinic for 15 minutes afterwards, and to report any adverse reactions.     Screening performed by Marisela Elaine CMA on 12/12/2024 at 1:49 PM.

## 2024-12-12 NOTE — PATIENT INSTRUCTIONS
"See travel packet provided  Recommend bens wipes (mosquito repellant), pepto bismol and imodium  The food and drink choices you make while traveling can impact your likelihood of getting sick.   If you aren't sure if a food or drink is safe, the saying \" BOIL IT, COOK IT, PEEL IT, OR FORGET IT\" can help you decide whether it's okay to consume.   Also bring hand  and sun screen with you.  Safe Travels     If you first start to get mild to moderate diarrhea, take imodium.      If diarrhea is severe or you have a fever with the diarrhea, take the antibiotic (azithromycin).      Today December 12, 2024 you received the    Yellow Fever (YF).    These appointments can be made as a NURSE ONLY visit.    **It is very important for the vaccinations to be given on the scheduled day(s), this helps ensure you receive the full effectiveness of the vaccine.**    Please call [facility] with any questions [phone]    Thank you for visiting [facility]    " Medication:   potassium CHLORIDE (KLOR-CON M) 20 MEQ gopi ER tablet    passed protocol.   Last office visit date: 06/18/24  Next appointment scheduled?: Yes 12/11/24  Number of refills given: 3     Potassium Supplement Refill Protocol - 12 Month Protocol [id number] 03:56 PM   Protocol Details Seen by prescribing provider or same department within the last 12 months or has a future appt in 3 months - IF FAILED PLEASE LOOK AT CHART REVIEW FOR LAST VISIT AND PROCEED ACCORDINGLY    Normal Potassium within last 12 months looking at last value -- IF CRITERIA FAILED REFER TO PROTOCOL DETAILS    Medication (including dose and sig) on current meds list